# Patient Record
Sex: MALE | Race: WHITE | NOT HISPANIC OR LATINO | URBAN - METROPOLITAN AREA
[De-identification: names, ages, dates, MRNs, and addresses within clinical notes are randomized per-mention and may not be internally consistent; named-entity substitution may affect disease eponyms.]

---

## 2018-08-27 ENCOUNTER — EMERGENCY (EMERGENCY)
Facility: HOSPITAL | Age: 23
LOS: 1 days | Discharge: HOME | End: 2018-08-27
Attending: EMERGENCY MEDICINE
Payer: COMMERCIAL

## 2018-08-27 VITALS
OXYGEN SATURATION: 100 % | RESPIRATION RATE: 18 BRPM | HEART RATE: 96 BPM | DIASTOLIC BLOOD PRESSURE: 64 MMHG | SYSTOLIC BLOOD PRESSURE: 108 MMHG

## 2018-08-27 LAB
ALBUMIN SERPL ELPH-MCNC: 4.6 G/DL — SIGNIFICANT CHANGE UP (ref 3.3–5)
ALP SERPL-CCNC: 65 U/L — SIGNIFICANT CHANGE UP (ref 40–120)
ALT FLD-CCNC: 16 U/L — SIGNIFICANT CHANGE UP (ref 10–45)
ANION GAP SERPL CALC-SCNC: 13 MMOL/L — SIGNIFICANT CHANGE UP (ref 5–17)
AST SERPL-CCNC: 19 U/L — SIGNIFICANT CHANGE UP (ref 10–40)
BASOPHILS # BLD AUTO: 0 K/UL — SIGNIFICANT CHANGE UP (ref 0–0.2)
BASOPHILS NFR BLD AUTO: 0.2 % — SIGNIFICANT CHANGE UP (ref 0–2)
BILIRUB SERPL-MCNC: 0.2 MG/DL — SIGNIFICANT CHANGE UP (ref 0.2–1.2)
BUN SERPL-MCNC: 18 MG/DL — SIGNIFICANT CHANGE UP (ref 7–23)
CALCIUM SERPL-MCNC: 9.2 MG/DL — SIGNIFICANT CHANGE UP (ref 8.4–10.5)
CHLORIDE SERPL-SCNC: 103 MMOL/L — SIGNIFICANT CHANGE UP (ref 96–108)
CK SERPL-CCNC: 215 U/L — HIGH (ref 30–200)
CO2 SERPL-SCNC: 23 MMOL/L — SIGNIFICANT CHANGE UP (ref 22–31)
CREAT SERPL-MCNC: 1.17 MG/DL — SIGNIFICANT CHANGE UP (ref 0.5–1.3)
EOSINOPHIL # BLD AUTO: 0 K/UL — SIGNIFICANT CHANGE UP (ref 0–0.5)
EOSINOPHIL NFR BLD AUTO: 0.5 % — SIGNIFICANT CHANGE UP (ref 0–6)
GAS PNL BLDV: SIGNIFICANT CHANGE UP
GLUCOSE SERPL-MCNC: 117 MG/DL — HIGH (ref 70–99)
HCT VFR BLD CALC: 44 % — SIGNIFICANT CHANGE UP (ref 39–50)
HGB BLD-MCNC: 14.4 G/DL — SIGNIFICANT CHANGE UP (ref 13–17)
LYMPHOCYTES # BLD AUTO: 1 K/UL — SIGNIFICANT CHANGE UP (ref 1–3.3)
LYMPHOCYTES # BLD AUTO: 12.3 % — LOW (ref 13–44)
MCHC RBC-ENTMCNC: 29 PG — SIGNIFICANT CHANGE UP (ref 27–34)
MCHC RBC-ENTMCNC: 32.9 GM/DL — SIGNIFICANT CHANGE UP (ref 32–36)
MCV RBC AUTO: 88.2 FL — SIGNIFICANT CHANGE UP (ref 80–100)
MONOCYTES # BLD AUTO: 0.7 K/UL — SIGNIFICANT CHANGE UP (ref 0–0.9)
MONOCYTES NFR BLD AUTO: 8.2 % — SIGNIFICANT CHANGE UP (ref 2–14)
NEUTROPHILS # BLD AUTO: 6.6 K/UL — SIGNIFICANT CHANGE UP (ref 1.8–7.4)
NEUTROPHILS NFR BLD AUTO: 78.9 % — HIGH (ref 43–77)
PLATELET # BLD AUTO: 254 K/UL — SIGNIFICANT CHANGE UP (ref 150–400)
POTASSIUM SERPL-MCNC: 4.4 MMOL/L — SIGNIFICANT CHANGE UP (ref 3.5–5.3)
POTASSIUM SERPL-SCNC: 4.4 MMOL/L — SIGNIFICANT CHANGE UP (ref 3.5–5.3)
PROT SERPL-MCNC: 7.3 G/DL — SIGNIFICANT CHANGE UP (ref 6–8.3)
RBC # BLD: 4.98 M/UL — SIGNIFICANT CHANGE UP (ref 4.2–5.8)
RBC # FLD: 11.6 % — SIGNIFICANT CHANGE UP (ref 10.3–14.5)
SODIUM SERPL-SCNC: 139 MMOL/L — SIGNIFICANT CHANGE UP (ref 135–145)
WBC # BLD: 8.4 K/UL — SIGNIFICANT CHANGE UP (ref 3.8–10.5)
WBC # FLD AUTO: 8.4 K/UL — SIGNIFICANT CHANGE UP (ref 3.8–10.5)

## 2018-08-27 PROCEDURE — 99284 EMERGENCY DEPT VISIT MOD MDM: CPT | Mod: 25

## 2018-08-27 PROCEDURE — 93010 ELECTROCARDIOGRAM REPORT: CPT | Mod: 59

## 2018-08-27 RX ORDER — LACOSAMIDE 50 MG/1
300 TABLET ORAL ONCE
Qty: 0 | Refills: 0 | Status: DISCONTINUED | OUTPATIENT
Start: 2018-08-27 | End: 2018-08-27

## 2018-08-27 RX ADMIN — LACOSAMIDE 300 MILLIGRAM(S): 50 TABLET ORAL at 23:57

## 2018-08-27 NOTE — ED PROVIDER NOTE - ATTENDING CONTRIBUTION TO CARE
23M pmh of epilepsy presents s/p multiple tonic-clonic seizure episodes. per pt and father pt was being brought to class, had brief tonic clonic seizure at 530 pm, brief <1 min, father states pt was completely recovered and went to class. pt while in class had two tonic-clonic seizures, brief but unsure of exact duration, did not regain full consciousness between them, +tongue biting, no loss of control of bladder. +brief post-ictal state after both. pt did receive versed from EMS but was not seizing at that time. +abrasions to forehead. immunizations are UTD per pt. pts seizures usually are absence, these were atypical. pt does have brain stimulator/pacemaker in place, voltage is currently being titrated, last changed wednesday. pt did take meds ~2 hrs earlier than normal this AM.  pt currently states he feels well. he denies headache, dizziness, change in vision, numbness, weakness, neck pain, or any other complaints. per parents he is currently acting at baseline.    PE: NAD, Multiple abrasions to face, most prominent one R forehead, with minimal swelling, no crepitus or instability of facial bones, jaw, skull, MMM, Trachea midline, Normal conjunctiva, lungs CTAB, S1/S2 RRR, Normal perfusion, 2+ radial pulses bilat, Abdomen Soft, NTND, No rebound/guarding, No LE edema, No deformity of extremities, No rashes,  No focal motor or sensory deficits. CN II-XII intact. Visual fields intact. EOMI, PERRLA. Facial sensation equal bilat. Smile and eye closure equal bilat. Hearing intact bilat. Palate elevation equal, tongue protrusion midline. Lateral head rotation equal bilat. 5/5 strength UE and LE bilat. Sensation grossly intact. No pronator drift. Normal finger to nose. Negative Romberg. Normal gait. FROM bilat UE and LE without bony or joint ttp. No midline C, T, L ttp.    VS without sig abnormality. Pt well appearing. Neuro exam wnl, without any complaints of headache, dizziness, or other neuro irregularities. Discussed risks/benefits of ct head, to defer at this time. To check labs eval for anemia, electrolyte imbalance. EKG performed which showed NSR without sig irregularities. To contact pt's neurologist/neurology NP at [facility] and further discuss best treatment and workup. - Terrence Hassan MD

## 2018-08-27 NOTE — ED PROVIDER NOTE - PHYSICAL EXAMINATION
Gen: NAD, AOx3, able to make his needs known, non-toxic //            Head: NCAT //            HEENT: EOMI, oral mucosa moist, normal conjunctiva //            Lung: CTAB, no respiratory distress, no wheezes/rhonchi/rales B/L, speaking in full sentences. //            CV: RRR, no murmurs, rubs or gallops //            Abd: soft, NTND, no guarding, no CVA tenderness //            MSK: no visible deformities //            Neuro: No focal sensory or motor deficits //            Skin: Warm, well perfused, no rash //            Psych: normal affect. Gen: NAD, AOx3, able to make his needs known, non-toxic //            Head: 3 cm area of erythema and swelling R front bone. Small abrasion R temporal region. Small abrasion L nasolabial fold. //            HEENT: EOMI, oral mucosa moist, normal conjunctiva //            Lung: CTAB, no respiratory distress, no wheezes/rhonchi/rales B/L //            CV: RRR, no murmurs or rubs //            Abd: soft, NTND, no guarding, no CVA tenderness //            MSK: no visible deformities //            Neuro: CN2-12 intact. Strenght 5/5 x4 extremities. No focal sensory or motor deficits //            Skin: Warm, well perfused//            Psych: normal affect.

## 2018-08-27 NOTE — ED PROVIDER NOTE - OBJECTIVE STATEMENT
24 y/o M w/ PMH of epilepsy presenting today with seizures. Pt was finishing up his class when he had a reported tonic clonic seizure. His father was called by a classmate and arrived and found the pt had regained consciousness and was acting mostly to his normal. However the pt suffered an additional tonic clonic seizure which lasted less than a minute. Pt stopped seizing, did not fully regain consciousness, and suffered another tonic clonic seizure that lasted approximately 15 secs. 24 y/o M w/ PMH of epilepsy presenting today with seizures. Pt was finishing up his class when he had a reported tonic clonic seizure lasting a reported 3 minutes. His father was called by a classmate and arrived and found the pt had regained consciousness and was acting mostly to his normal. However the pt suffered an additional tonic clonic seizure which lasted less than a minute. Pt stopped seizing, did not fully regain consciousness, and suffered another tonic clonic seizure that lasted approximately 15 secs. Pt did not lose control of his bladder. Did bite his tongue. Did suffer some abrasions to his head. Pt did not suffer another seizure but was given versed by EMS. Per father pt had a tonic clonic seizure at approximately 5:30pm today. Additionally had 1 absence seizure earlier in the day. Pt will normally have a few seizures a week and are reportedly usually absence. Pt rarely has tonic clonic seizures and has never had this many in one day. Pt had electrodes placed in his brain back in February to help control seizures. The voltage is regularly adjusted and most recently was changed on Wednesday and the dose of his fycampa. Pt takes fycampa, onfi, vimpat, and hemp pills for seizure control. Pt reports taking his medications 2 hours early today. His neurologist is Dr. Taz Mcgill out of [place]. Pt reports feeling back to normal and parents state he is acting his normal.

## 2018-08-27 NOTE — ED PROVIDER NOTE - CARE PLAN
Principal Discharge DX:	Seizures  Assessment and plan of treatment:	1. Take home medications as prescribed  2. Take Klonopin, 2mg ODT for recurrence of seizures as prescribed  3. Follow-up with Dr. Mcgill, neurology, tomorrow  4. Return immediately for any worsening or concerning symptoms as discussed including headache, dizziness, change in vision, numbness, weakness, recurrence of seizures, or anything else that concerns you

## 2018-08-27 NOTE — ED PROVIDER NOTE - PROGRESS NOTE DETAILS
I had an extensive conversation with NPJacquelyn (works with Taz Mcgill) regarding patient presentation. I explained patient's presentation, including number of seizures and timing. She believes that these are likely due to recent changes in his neuro pacemaker. She believes that patient can be safely discharged home, and to f/u tomorrow at [facility]. She did also recommend that patient be rx'd klonipin, 2 mg odt for home use for any seizure-like activity. I had an extensive discussion with patient and parents regarding risks/benefits of discharge vs admission for observation at hospital. Pt and parents feel it is safe for pt to be d/c'd home at this time and have been in contact with pt's neuro NP Jacquelyn. I was able to send an Rx for Klonopin ODT to a 24 hr pharmacy which pt can  on way home. Parents will call EMS if any persistent seizure activity at home. They will f/u with pt's neurologist tomorrow. An opportunity to ask questions was provided and all answered. Pt with no recurrent seizure-like activity in ED. Pt is stable for d/c home. - Terrence Hassan MD I had an extensive conversation with NPJacquelyn (works with Taz Mcgill) regarding patient presentation. I explained patient's presentation, including number of seizures and timing. She believes that these are likely due to recent changes in his neuro pacemaker. She advises against any neuro imaging at this time including head CT. She believes that patient can be safely discharged home, and to f/u tomorrow at [facility]. She did also recommend that patient be rx'd klonipin, 2 mg odt for home use for any seizure-like activity. I had an extensive discussion with patient and parents regarding risks/benefits of discharge vs admission for observation at hospital. Pt and parents feel it is safe for pt to be d/c'd home at this time and have been in contact with pt's neuro NP Jacquelyn. I was able to send an Rx for Klonopin ODT to a 24 hr pharmacy which pt can  on way home. Parents will call EMS if any persistent seizure activity at home. They will f/u with pt's neurologist tomorrow. An opportunity to ask questions was provided and all answered. Pt with no recurrent seizure-like activity in ED. Pt is stable for d/c home. - Terrence Hassan MD

## 2018-08-27 NOTE — ED PROVIDER NOTE - PLAN OF CARE
1. Take home medications as prescribed  2. Take Klonopin, 2mg ODT for recurrence of seizures as prescribed  3. Follow-up with Dr. Mcgill, neurology, tomorrow  4. Return immediately for any worsening or concerning symptoms as discussed including headache, dizziness, change in vision, numbness, weakness, recurrence of seizures, or anything else that concerns you

## 2018-08-28 VITALS
SYSTOLIC BLOOD PRESSURE: 115 MMHG | OXYGEN SATURATION: 100 % | DIASTOLIC BLOOD PRESSURE: 86 MMHG | HEART RATE: 78 BPM | RESPIRATION RATE: 18 BRPM

## 2018-08-28 PROCEDURE — 85014 HEMATOCRIT: CPT

## 2018-08-28 PROCEDURE — 82947 ASSAY GLUCOSE BLOOD QUANT: CPT

## 2018-08-28 PROCEDURE — 82803 BLOOD GASES ANY COMBINATION: CPT

## 2018-08-28 PROCEDURE — 99283 EMERGENCY DEPT VISIT LOW MDM: CPT

## 2018-08-28 PROCEDURE — 82330 ASSAY OF CALCIUM: CPT

## 2018-08-28 PROCEDURE — 82550 ASSAY OF CK (CPK): CPT

## 2018-08-28 PROCEDURE — 80053 COMPREHEN METABOLIC PANEL: CPT

## 2018-08-28 PROCEDURE — 85027 COMPLETE CBC AUTOMATED: CPT

## 2018-08-28 PROCEDURE — 82435 ASSAY OF BLOOD CHLORIDE: CPT

## 2018-08-28 PROCEDURE — 83605 ASSAY OF LACTIC ACID: CPT

## 2018-08-28 PROCEDURE — 93005 ELECTROCARDIOGRAM TRACING: CPT

## 2018-08-28 PROCEDURE — 84295 ASSAY OF SERUM SODIUM: CPT

## 2018-08-28 PROCEDURE — 84132 ASSAY OF SERUM POTASSIUM: CPT

## 2018-08-28 RX ORDER — CLONAZEPAM 1 MG
1 TABLET ORAL
Qty: 9 | Refills: 0 | OUTPATIENT
Start: 2018-08-28

## 2024-05-23 ENCOUNTER — APPOINTMENT (OUTPATIENT)
Dept: ORTHOPEDIC SURGERY | Facility: CLINIC | Age: 29
End: 2024-05-23
Payer: COMMERCIAL

## 2024-05-23 ENCOUNTER — NON-APPOINTMENT (OUTPATIENT)
Age: 29
End: 2024-05-23

## 2024-05-23 DIAGNOSIS — Z00.00 ENCOUNTER FOR GENERAL ADULT MEDICAL EXAMINATION W/OUT ABNORMAL FINDINGS: ICD-10-CM

## 2024-05-23 PROCEDURE — 99204 OFFICE O/P NEW MOD 45 MIN: CPT

## 2024-05-23 PROCEDURE — 73630 X-RAY EXAM OF FOOT: CPT | Mod: 50

## 2024-05-23 NOTE — ADDENDUM
[FreeTextEntry1] : I, Efra Valles, acted solely as a scribe for Dr. Efra Laws on this date 05/23/2024  .   All medical record entries made by the Scribe were at my, Dr. Efra Laws, direction and personally dictated by me on 05/23/2024 . I have reviewed the chart and agree that the record accurately reflects my personal performance of the history, physical exam, assessment and plan. I have also personally directed, reviewed, and agreed with the chart.

## 2024-05-23 NOTE — PHYSICAL EXAM
[de-identified] : L Foot Physical Examination:  General: Alert and oriented x3.  In no acute distress.  Pleasant in nature with a normal affect.  No apparent respiratory distress.  Erythema, Warmth, Rubor: Negative Swelling: positive  limited tenderness to the lisfranc region  ROM Ankle: 1. Dorsiflexion: 10 degrees 2. Plantarflexion: 40 degrees 3. Inversion: 30 degrees 4. Eversion: 20 degrees  ROM of digits: Normal  Pes Planus: Negative Pes Cavus: Negative  Bunion: Negative Tailor's Bunion (Bunionette): Negative Hammer Toe Deformity/Deformities: Negative  Tenderness to Palpation:  1. Heel Pain: Negative 2. Midfoot Pain: Negative 3. First MTP Joint: Negative 4. Lis Franc Joint: Negative  Tenderness Metatarsals: 1st MT: Negative 2nd MT: Negative 3rd MT: Negative 4th MT: Negative 5th MT: Negative Base of the 5th MT: Negative  Ligament Pain: 1. Lis Franc Ligament: Negative 2. Plantar Fascia Ligament: Negative  Strength:  5/5 TA/GS/EHL/FHL/EDL/ADD/ABD  Pulses: 2+ DP/PT Pulses  Capillary Refill Toes: <2 seconds  Neuro: Intact motor and sensory throughout  Additional Test: 1. Rivas's Squeeze Test: Negative 2. Calcaneal Squeeze Test: Negative [de-identified] : 6V of the left and right foot were ordered obtained and reviewed by me today, 05/23/2024 , revealed: fractures visible on the left foot Ct reviewed from [place]

## 2024-05-23 NOTE — HISTORY OF PRESENT ILLNESS
[FreeTextEntry1] : Patient is a 29-year-old male who presents with left foot pain. He has epilepsy and fell out of his bed 2 weeks ago which caused him to hit the metal rods that were nearby. He has already been to one podiatrist and one orthopedist so far. He was informed that he has a 4th and 5th metatarsal fracture and possible a Lisfranc fracture. He received his epilepsy care by Dr. Shah in [facility]. The patient presents to the office NWB wearing a CAM boot and ambulating with crutches.

## 2024-05-23 NOTE — DISCUSSION/SUMMARY
[de-identified] :  Today I had a lengthy discussion with the patient regarding their left foot pain. I have addressed all the patient's concerns surrounding the pathology of their condition. I do not recommend surgery and would like to pursue conservative treatment.   I have reviewed the patient's XR imaging with them in great detail.  I have reviewed the patient's CT Scan results with them in great detail. I recommend that the patient utilize ice, NSAIDS PRN, and heat. They can also elevate their LLE above the level of the heart. I recommend that the patient limit their weight bearing for the time being. I informed the patient to discuss with his neurologist the meloxicam prescription that I prescribed so that it does not affect his seizure threshold. I recommend that the patient utilize meloxicam with food once per day as instructed. A prescription for the meloxicam was ordered for the patient in the office today.   The patient understood and verbally agreed to the treatment plan. All of their questions were answered and they were satisfied with the visit. The patient should call the office if they have any questions or experience worsening symptoms.  FU in 1 month

## 2024-05-29 RX ORDER — MELOXICAM 15 MG/1
15 TABLET ORAL
Qty: 30 | Refills: 2 | Status: ACTIVE | COMMUNITY
Start: 2024-05-23 | End: 1900-01-01

## 2024-06-24 ENCOUNTER — APPOINTMENT (OUTPATIENT)
Dept: ORTHOPEDIC SURGERY | Facility: CLINIC | Age: 29
End: 2024-06-24
Payer: COMMERCIAL

## 2024-06-24 DIAGNOSIS — S92.332A DISPLACED FRACTURE OF THIRD METATARSAL BONE, LEFT FOOT, INITIAL ENCOUNTER FOR CLOSED FRACTURE: ICD-10-CM

## 2024-06-24 DIAGNOSIS — S92.342A DISPLACED FRACTURE OF FOURTH METATARSAL BONE, LEFT FOOT, INITIAL ENCOUNTER FOR CLOSED FRACTURE: ICD-10-CM

## 2024-06-24 DIAGNOSIS — G40.909 EPILEPSY, UNSPECIFIED, NOT INTRACTABLE, W/OUT STATUS EPILEPTICUS: ICD-10-CM

## 2024-06-24 DIAGNOSIS — S92.322A DISPLACED FRACTURE OF SECOND METATARSAL BONE, LEFT FOOT, INITIAL ENCOUNTER FOR CLOSED FRACTURE: ICD-10-CM

## 2024-06-24 DIAGNOSIS — S93.622D SPRAIN OF TARSOMETATARSAL LIGAMENT OF LEFT FOOT, SUBSEQUENT ENCOUNTER: ICD-10-CM

## 2024-06-24 PROCEDURE — 73630 X-RAY EXAM OF FOOT: CPT | Mod: LT

## 2024-06-24 PROCEDURE — 99214 OFFICE O/P EST MOD 30 MIN: CPT

## 2024-06-24 PROCEDURE — 99024 POSTOP FOLLOW-UP VISIT: CPT

## 2024-08-14 ENCOUNTER — APPOINTMENT (OUTPATIENT)
Dept: ORTHOPEDIC SURGERY | Facility: CLINIC | Age: 29
End: 2024-08-14

## 2025-06-17 NOTE — ED ADULT NURSE NOTE - OBJECTIVE STATEMENT
pt is a 23 yr M brought in by EMS s/p 3 seizures, pt has a seizure d pt is a 23 yr M brought in by EMS s/p 3 seizures, pt has a seizure disorder and a "neuro pacer" placed in February, as per family once a month the pacer is adjusted, last adjustment was on Wednesday. today pt had 3 witnessed seizures lasting approx 15 secs, pt was given 5mg of versed by EMS. pt has bruising and abrasions to forehead. full ROM. pt currently a&ox3, does not remember events. as per family back to baseline mental status. 22